# Patient Record
Sex: FEMALE | Race: WHITE | NOT HISPANIC OR LATINO | URBAN - METROPOLITAN AREA
[De-identification: names, ages, dates, MRNs, and addresses within clinical notes are randomized per-mention and may not be internally consistent; named-entity substitution may affect disease eponyms.]

---

## 2018-07-14 ENCOUNTER — INPATIENT (INPATIENT)
Facility: HOSPITAL | Age: 83
LOS: 3 days | Discharge: NURSING, REHAB OR LONG-TERM CARE | End: 2018-07-18
Attending: HOSPITALIST | Admitting: HOSPITALIST
Payer: MEDICARE

## 2018-07-14 VITALS
TEMPERATURE: 98 F | DIASTOLIC BLOOD PRESSURE: 101 MMHG | HEART RATE: 79 BPM | RESPIRATION RATE: 18 BRPM | SYSTOLIC BLOOD PRESSURE: 204 MMHG | WEIGHT: 160.06 LBS | OXYGEN SATURATION: 95 %

## 2018-07-14 DIAGNOSIS — I10 ESSENTIAL (PRIMARY) HYPERTENSION: ICD-10-CM

## 2018-07-14 DIAGNOSIS — Z98.890 OTHER SPECIFIED POSTPROCEDURAL STATES: Chronic | ICD-10-CM

## 2018-07-14 DIAGNOSIS — E78.00 PURE HYPERCHOLESTEROLEMIA, UNSPECIFIED: ICD-10-CM

## 2018-07-14 DIAGNOSIS — C50.919 MALIGNANT NEOPLASM OF UNSPECIFIED SITE OF UNSPECIFIED FEMALE BREAST: ICD-10-CM
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
## 2018-07-14 DIAGNOSIS — T79.6XXA TRAUMATIC ISCHEMIA OF MUSCLE, INITIAL ENCOUNTER: ICD-10-CM

## 2018-07-14 LAB
ALBUMIN SERPL ELPH-MCNC: 4.6 G/DL — SIGNIFICANT CHANGE UP (ref 3.5–5.2)
ALP SERPL-CCNC: 59 U/L — SIGNIFICANT CHANGE UP (ref 30–115)
ALT FLD-CCNC: 18 U/L — SIGNIFICANT CHANGE UP (ref 0–41)
ANION GAP SERPL CALC-SCNC: 16 MMOL/L — HIGH (ref 7–14)
APTT BLD: 22.1 SEC — CRITICAL LOW (ref 27–39.2)
AST SERPL-CCNC: 48 U/L — HIGH (ref 0–41)
BASOPHILS # BLD AUTO: 0.01 K/UL — SIGNIFICANT CHANGE UP (ref 0–0.2)
BASOPHILS NFR BLD AUTO: 0.1 % — SIGNIFICANT CHANGE UP (ref 0–1)
BILIRUB SERPL-MCNC: 1.1 MG/DL — SIGNIFICANT CHANGE UP (ref 0.2–1.2)
BUN SERPL-MCNC: 28 MG/DL — HIGH (ref 10–20)
CALCIUM SERPL-MCNC: 9.8 MG/DL — SIGNIFICANT CHANGE UP (ref 8.5–10.1)
CHLORIDE SERPL-SCNC: 99 MMOL/L — SIGNIFICANT CHANGE UP (ref 98–110)
CK SERPL-CCNC: >2000 U/L — HIGH (ref 0–225)
CO2 SERPL-SCNC: 27 MMOL/L — SIGNIFICANT CHANGE UP (ref 17–32)
CREAT SERPL-MCNC: 0.9 MG/DL — SIGNIFICANT CHANGE UP (ref 0.7–1.5)
EOSINOPHIL # BLD AUTO: 0 K/UL — SIGNIFICANT CHANGE UP (ref 0–0.7)
EOSINOPHIL NFR BLD AUTO: 0 % — SIGNIFICANT CHANGE UP (ref 0–8)
GLUCOSE SERPL-MCNC: 118 MG/DL — HIGH (ref 70–99)
HCT VFR BLD CALC: 39.3 % — SIGNIFICANT CHANGE UP (ref 37–47)
HGB BLD-MCNC: 13.5 G/DL — SIGNIFICANT CHANGE UP (ref 12–16)
IMM GRANULOCYTES NFR BLD AUTO: 0.3 % — SIGNIFICANT CHANGE UP (ref 0.1–0.3)
INR BLD: 1.04 RATIO — SIGNIFICANT CHANGE UP (ref 0.65–1.3)
LYMPHOCYTES # BLD AUTO: 0.77 K/UL — LOW (ref 1.2–3.4)
LYMPHOCYTES # BLD AUTO: 8.1 % — LOW (ref 20.5–51.1)
MAGNESIUM SERPL-MCNC: 2.2 MG/DL — SIGNIFICANT CHANGE UP (ref 1.8–2.4)
MCHC RBC-ENTMCNC: 32.2 PG — HIGH (ref 27–31)
MCHC RBC-ENTMCNC: 34.4 G/DL — SIGNIFICANT CHANGE UP (ref 32–37)
MCV RBC AUTO: 93.8 FL — SIGNIFICANT CHANGE UP (ref 81–99)
MONOCYTES # BLD AUTO: 0.51 K/UL — SIGNIFICANT CHANGE UP (ref 0.1–0.6)
MONOCYTES NFR BLD AUTO: 5.4 % — SIGNIFICANT CHANGE UP (ref 1.7–9.3)
NEUTROPHILS # BLD AUTO: 8.16 K/UL — HIGH (ref 1.4–6.5)
NEUTROPHILS NFR BLD AUTO: 86.1 % — HIGH (ref 42.2–75.2)
NRBC # BLD: 0 /100 WBCS — SIGNIFICANT CHANGE UP (ref 0–0)
PLATELET # BLD AUTO: 174 K/UL — SIGNIFICANT CHANGE UP (ref 130–400)
POTASSIUM SERPL-MCNC: 3.9 MMOL/L — SIGNIFICANT CHANGE UP (ref 3.5–5)
POTASSIUM SERPL-SCNC: 3.9 MMOL/L — SIGNIFICANT CHANGE UP (ref 3.5–5)
PROT SERPL-MCNC: 7.6 G/DL — SIGNIFICANT CHANGE UP (ref 6–8)
PROTHROM AB SERPL-ACNC: 11.2 SEC — SIGNIFICANT CHANGE UP (ref 9.95–12.87)
RBC # BLD: 4.19 M/UL — LOW (ref 4.2–5.4)
RBC # FLD: 12.7 % — SIGNIFICANT CHANGE UP (ref 11.5–14.5)
SODIUM SERPL-SCNC: 142 MMOL/L — SIGNIFICANT CHANGE UP (ref 135–146)
WBC # BLD: 9.48 K/UL — SIGNIFICANT CHANGE UP (ref 4.8–10.8)
WBC # FLD AUTO: 9.48 K/UL — SIGNIFICANT CHANGE UP (ref 4.8–10.8)

## 2018-07-14 RX ORDER — CARVEDILOL PHOSPHATE 80 MG/1
12.5 CAPSULE, EXTENDED RELEASE ORAL EVERY 12 HOURS
Qty: 0 | Refills: 0 | Status: DISCONTINUED | OUTPATIENT
Start: 2018-07-14 | End: 2018-07-18

## 2018-07-14 RX ORDER — ACETAMINOPHEN 500 MG
325 TABLET ORAL EVERY 4 HOURS
Qty: 0 | Refills: 0 | Status: DISCONTINUED | OUTPATIENT
Start: 2018-07-14 | End: 2018-07-18

## 2018-07-14 RX ORDER — ATORVASTATIN CALCIUM 80 MG/1
1 TABLET, FILM COATED ORAL
Qty: 0 | Refills: 0 | COMMUNITY

## 2018-07-14 RX ORDER — CARVEDILOL PHOSPHATE 80 MG/1
1 CAPSULE, EXTENDED RELEASE ORAL
Qty: 0 | Refills: 0 | COMMUNITY

## 2018-07-14 RX ORDER — ENOXAPARIN SODIUM 100 MG/ML
30 INJECTION SUBCUTANEOUS EVERY 24 HOURS
Qty: 0 | Refills: 0 | Status: DISCONTINUED | OUTPATIENT
Start: 2018-07-14 | End: 2018-07-18

## 2018-07-14 RX ORDER — SODIUM CHLORIDE 9 MG/ML
1000 INJECTION INTRAMUSCULAR; INTRAVENOUS; SUBCUTANEOUS
Qty: 0 | Refills: 0 | Status: DISCONTINUED | OUTPATIENT
Start: 2018-07-14 | End: 2018-07-18

## 2018-07-14 RX ORDER — ATORVASTATIN CALCIUM 80 MG/1
10 TABLET, FILM COATED ORAL DAILY
Qty: 0 | Refills: 0 | Status: DISCONTINUED | OUTPATIENT
Start: 2018-07-14 | End: 2018-07-16

## 2018-07-14 RX ORDER — SODIUM CHLORIDE 9 MG/ML
1000 INJECTION INTRAMUSCULAR; INTRAVENOUS; SUBCUTANEOUS
Qty: 0 | Refills: 0 | Status: DISCONTINUED | OUTPATIENT
Start: 2018-07-14 | End: 2018-07-14

## 2018-07-14 RX ORDER — LETROZOLE 2.5 MG/1
1 TABLET, FILM COATED ORAL
Qty: 0 | Refills: 0 | COMMUNITY

## 2018-07-14 RX ORDER — PANTOPRAZOLE SODIUM 20 MG/1
40 TABLET, DELAYED RELEASE ORAL
Qty: 0 | Refills: 0 | Status: DISCONTINUED | OUTPATIENT
Start: 2018-07-14 | End: 2018-07-18

## 2018-07-14 RX ORDER — LETROZOLE 2.5 MG/1
2.5 TABLET, FILM COATED ORAL DAILY
Qty: 0 | Refills: 0 | Status: DISCONTINUED | OUTPATIENT
Start: 2018-07-14 | End: 2018-07-18

## 2018-07-14 RX ORDER — EZETIMIBE 10 MG/1
1 TABLET ORAL
Qty: 0 | Refills: 0 | COMMUNITY

## 2018-07-14 RX ADMIN — ATORVASTATIN CALCIUM 10 MILLIGRAM(S): 80 TABLET, FILM COATED ORAL at 17:29

## 2018-07-14 RX ADMIN — CARVEDILOL PHOSPHATE 12.5 MILLIGRAM(S): 80 CAPSULE, EXTENDED RELEASE ORAL at 14:05

## 2018-07-14 RX ADMIN — SODIUM CHLORIDE 200 MILLILITER(S): 9 INJECTION INTRAMUSCULAR; INTRAVENOUS; SUBCUTANEOUS at 14:07

## 2018-07-14 RX ADMIN — SODIUM CHLORIDE 75 MILLILITER(S): 9 INJECTION INTRAMUSCULAR; INTRAVENOUS; SUBCUTANEOUS at 16:34

## 2018-07-14 RX ADMIN — PANTOPRAZOLE SODIUM 40 MILLIGRAM(S): 20 TABLET, DELAYED RELEASE ORAL at 17:29

## 2018-07-14 RX ADMIN — Medication 2.5 MILLIGRAM(S): at 17:30

## 2018-07-14 RX ADMIN — ENOXAPARIN SODIUM 30 MILLIGRAM(S): 100 INJECTION SUBCUTANEOUS at 17:29

## 2018-07-14 RX ADMIN — LETROZOLE 2.5 MILLIGRAM(S): 2.5 TABLET, FILM COATED ORAL at 17:29

## 2018-07-14 NOTE — H&P ADULT - ASSESSMENT
89 y/o female admitted after mechanical fall at home, unable to walk in ER with traumatic muscle injury ( rhabdomyolysis) getting admitted for IV hydration and PT, most likely pt needs placement.

## 2018-07-14 NOTE — H&P ADULT - NSHPLABSRESULTS_GEN_ALL_CORE
13.5   9.48  )-----------( 174      ( 14 Jul 2018 11:26 )             39.3   07-14    142  |  99  |  28<H>  ----------------------------<  118<H>  3.9   |  27  |  0.9    Ca    9.8      14 Jul 2018 11:26  Mg     2.2     07-14    TPro  7.6  /  Alb  4.6  /  TBili  1.1  /  DBili  x   /  AST  48<H>  /  ALT  18  /  AlkPhos  59  07-14  RADIOLOGY:   < from: Xray Foot AP + Lateral, Left (07.14.18 @ 11:54) >    Findings/  impression: No acutely displaced fractures with osteopenia. Hallux valgus   deformity with moderate osteoarthritis. Second hammertoe deformity   present. No radiopaque foreign body. Midfoot and forefoot neuropathic   degenerative changes with vascular calcifications.    < from: Xray Femur 2 Views, Left (07.14.18 @ 11:53) >    Findings/  impression: No acute fracture or dislocation. Greater trochanter   enthesopathy. Moderate left hip degenerative change and vascular   calcifications are noted.    < from: Xray Chest 1 View- PORTABLE-Urgent (07.14.18 @ 11:52) >    < from: Xray Tibia + Fibula 2 Views, Left (07.14.18 @ 11:52) >      Findings/  impression: No acute fracture. Knee and ankle alignment maintained.   Proximal medial tibia mid tibial soft tissue swelling is present. No   radiopaque foreign body or subcutaneous emphysema. Vascular   calcifications noted    Impression:      No radiographic evidence of acute cardiopulmonary disease.

## 2018-07-14 NOTE — ED PROVIDER NOTE - MEDICAL DECISION MAKING DETAILS
Chart finished. pt s/p fall, lives alone, PE as above, imaging unremarkable but unable to safely stand/walk, will admit for rehab/possible placement

## 2018-07-14 NOTE — ED PROVIDER NOTE - OBJECTIVE STATEMENT
Fell at home last night trying to clean floor, Found by neighbor this am, No head injury no LOC, C/o right lower leg and buttock pain, No SOB, no back or abdominal pain,

## 2018-07-14 NOTE — H&P ADULT - NSHPPHYSICALEXAM_GEN_ALL_CORE
PHYSICAL EXAM:      Constitutional: frail elderly female   Neck: supple, no JVD  Back: no paraspinal tenderness  Respiratory: decreased BS b/l   Cardiovascular: S1, S2, RRR   Gastrointestinal: soft, NT, ND, BS present   Extremities: no edema on LE  Vascular: peripheral pulses intact   Neurological: AAOx3, no focal neurological deficit   Skin: dry skin     Vital Signs Last 24 Hrs  T(C): 36.4 (14 Jul 2018 10:59), Max: 36.4 (14 Jul 2018 10:59)  T(F): 97.6 (14 Jul 2018 10:59), Max: 97.6 (14 Jul 2018 10:59)  HR: 81 (14 Jul 2018 14:21) (79 - 81)  BP: 190/98 (14 Jul 2018 14:21) (190/98 - 204/101)  RR: 17 (14 Jul 2018 14:21) (17 - 18)  SpO2: 96% (14 Jul 2018 14:21) (95% - 96%) PHYSICAL EXAM:  Constitutional: frail elderly female   Neck: supple, no JVD  Back: no paraspinal tenderness  Respiratory: decreased BS b/l   Cardiovascular: S1, S2, RRR   Gastrointestinal: soft, NT, ND, BS present   Extremities: no edema on LE  Vascular: peripheral pulses intact   Neurological: AAOx3, no focal neurological deficit   Skin: dry skin, large ecchymose on LLE and left elbow     Vital Signs Last 24 Hrs  T(C): 36.4 (14 Jul 2018 10:59), Max: 36.4 (14 Jul 2018 10:59)  T(F): 97.6 (14 Jul 2018 10:59), Max: 97.6 (14 Jul 2018 10:59)  HR: 81 (14 Jul 2018 14:21) (79 - 81)  BP: 190/98 (14 Jul 2018 14:21) (190/98 - 204/101)  RR: 17 (14 Jul 2018 14:21) (17 - 18)  SpO2: 96% (14 Jul 2018 14:21) (95% - 96%)

## 2018-07-14 NOTE — ED ADULT NURSE REASSESSMENT NOTE - NS ED NURSE REASSESS COMMENT FT1
Skin intach no signs of breakdown; Ecchymosis to L lower extremity and b/l arms; sacrum reddened but blanchable.

## 2018-07-14 NOTE — H&P ADULT - PROBLEM SELECTOR PLAN 1
fall precautions, pain meds PRN, PT/Rehab, multiple XRAys are negative for Fx, will check UA , urine culture

## 2018-07-14 NOTE — H&P ADULT - HISTORY OF PRESENT ILLNESS
89 y/o female fell at home last night trying to clean floor. She was found by neighbor this morning, pt was not able to get up. Pt lives alone and refused assisted living in the past, she was brought to ER and was not able to ambulate.  No head injury no LOC, pt complaints of  right lower leg and buttock pain, No SOB, no back or abdominal pain. 89 y/o female fell at home last night trying to clean the floor. She was found by neighbor this morning, pt was not able to get up. Pt lives alone and refused assisted living in the past, she was brought to ER and was not able to ambulate.  No head injury no LOC, pt complaints of  left LE pain and bruises on left leg and left elbow.    In ER patient was very anxious and BP was elevated. I spoke with her granddaughter at the time of admission.

## 2018-07-15 LAB
ALBUMIN SERPL ELPH-MCNC: 3.5 G/DL — SIGNIFICANT CHANGE UP (ref 3.5–5.2)
ALP SERPL-CCNC: 44 U/L — SIGNIFICANT CHANGE UP (ref 30–115)
ALT FLD-CCNC: 20 U/L — SIGNIFICANT CHANGE UP (ref 0–41)
ANION GAP SERPL CALC-SCNC: 17 MMOL/L — HIGH (ref 7–14)
AST SERPL-CCNC: 55 U/L — HIGH (ref 0–41)
BILIRUB SERPL-MCNC: 0.9 MG/DL — SIGNIFICANT CHANGE UP (ref 0.2–1.2)
BUN SERPL-MCNC: 29 MG/DL — HIGH (ref 10–20)
CALCIUM SERPL-MCNC: 8.1 MG/DL — LOW (ref 8.5–10.1)
CHLORIDE SERPL-SCNC: 101 MMOL/L — SIGNIFICANT CHANGE UP (ref 98–110)
CK SERPL-CCNC: 2662 U/L — HIGH (ref 0–225)
CO2 SERPL-SCNC: 22 MMOL/L — SIGNIFICANT CHANGE UP (ref 17–32)
CREAT SERPL-MCNC: 0.8 MG/DL — SIGNIFICANT CHANGE UP (ref 0.7–1.5)
GLUCOSE SERPL-MCNC: 99 MG/DL — SIGNIFICANT CHANGE UP (ref 70–99)
HCT VFR BLD CALC: 32.6 % — LOW (ref 37–47)
HGB BLD-MCNC: 10.9 G/DL — LOW (ref 12–16)
MAGNESIUM SERPL-MCNC: 2.1 MG/DL — SIGNIFICANT CHANGE UP (ref 1.8–2.4)
MCHC RBC-ENTMCNC: 31.5 PG — HIGH (ref 27–31)
MCHC RBC-ENTMCNC: 33.4 G/DL — SIGNIFICANT CHANGE UP (ref 32–37)
MCV RBC AUTO: 94.2 FL — SIGNIFICANT CHANGE UP (ref 81–99)
NRBC # BLD: 0 /100 WBCS — SIGNIFICANT CHANGE UP (ref 0–0)
PLATELET # BLD AUTO: 147 K/UL — SIGNIFICANT CHANGE UP (ref 130–400)
POTASSIUM SERPL-MCNC: 3.6 MMOL/L — SIGNIFICANT CHANGE UP (ref 3.5–5)
POTASSIUM SERPL-SCNC: 3.6 MMOL/L — SIGNIFICANT CHANGE UP (ref 3.5–5)
PROT SERPL-MCNC: 6 G/DL — SIGNIFICANT CHANGE UP (ref 6–8)
RBC # BLD: 3.46 M/UL — LOW (ref 4.2–5.4)
RBC # FLD: 13 % — SIGNIFICANT CHANGE UP (ref 11.5–14.5)
SODIUM SERPL-SCNC: 140 MMOL/L — SIGNIFICANT CHANGE UP (ref 135–146)
WBC # BLD: 6.95 K/UL — SIGNIFICANT CHANGE UP (ref 4.8–10.8)
WBC # FLD AUTO: 6.95 K/UL — SIGNIFICANT CHANGE UP (ref 4.8–10.8)

## 2018-07-15 RX ADMIN — LETROZOLE 2.5 MILLIGRAM(S): 2.5 TABLET, FILM COATED ORAL at 15:05

## 2018-07-15 RX ADMIN — SODIUM CHLORIDE 75 MILLILITER(S): 9 INJECTION INTRAMUSCULAR; INTRAVENOUS; SUBCUTANEOUS at 06:21

## 2018-07-15 RX ADMIN — CARVEDILOL PHOSPHATE 12.5 MILLIGRAM(S): 80 CAPSULE, EXTENDED RELEASE ORAL at 06:20

## 2018-07-15 RX ADMIN — PANTOPRAZOLE SODIUM 40 MILLIGRAM(S): 20 TABLET, DELAYED RELEASE ORAL at 06:21

## 2018-07-15 RX ADMIN — CARVEDILOL PHOSPHATE 12.5 MILLIGRAM(S): 80 CAPSULE, EXTENDED RELEASE ORAL at 17:19

## 2018-07-15 RX ADMIN — Medication 2.5 MILLIGRAM(S): at 06:21

## 2018-07-15 NOTE — PROGRESS NOTE ADULT - ASSESSMENT
89 y/o female admitted after mechanical fall at home, unable to walk in ER with traumatic muscle injury ( rhabdomyolysis) getting admitted for IV hydration and PT, most likely pt needs placement.

## 2018-07-15 NOTE — PHYSICAL THERAPY INITIAL EVALUATION ADULT - IMPAIRMENTS FOUND, PT EVAL
aerobic capacity/endurance/ergonomics and body mechanics/muscle strength/posture/gait, locomotion, and balance

## 2018-07-15 NOTE — PROGRESS NOTE ADULT - PROBLEM SELECTOR PLAN 1
fall precautions, pain meds PRN, PT/Rehab, multiple XRAys are negative for Fx,  continue ivf and follow ck levels

## 2018-07-15 NOTE — PROGRESS NOTE ADULT - SUBJECTIVE AND OBJECTIVE BOX
Patient is comfortable in bed no complaints      T(F): 98.1 (07-15-18 @ 05:18), Max: 98.1 (07-15-18 @ 05:18)  HR: 76 (07-15-18 @ 05:18)  BP: 111/56 (07-15-18 @ 05:18)  RR: 16 (07-15-18 @ 05:18)  SpO2: 96% (07-14-18 @ 14:21) (96% - 96%)    PHYSICAL EXAM:  GENERAL: NAD, well-groomed, well-developed  HEAD:  Atraumatic, Normocephalic  EYES: EOMI, PERRLA, conjunctiva and sclera clear  ENMT: No tonsillar erythema, exudates, or enlargement; Moist mucous membranes, Good dentition, No lesions  NECK: Supple, No JVD, Normal thyroid  NERVOUS SYSTEM:  Alert & Oriented X3, Good concentration; Motor Strength 5/5 B/L upper and lower extremities; DTRs 2+ intact and symmetric  CHEST/LUNG: Clear to percussion bilaterally; No rales, rhonchi, wheezing, or rubs  HEART: Regular rate and rhythm; No murmurs, rubs, or gallops  ABDOMEN: Soft, Nontender, Nondistended; Bowel sounds present  EXTREMITIES:  2+ Peripheral Pulses, No clubbing, cyanosis, or edema  LYMPH: No lymphadenopathy noted  SKIN: No rashes or lesions    LABS  07-15    140  |  101  |  29<H>  ----------------------------<  99  3.6   |  22  |  0.8    Ca    8.1<L>      15 Jul 2018 06:50  Mg     2.1     07-15    TPro  6.0  /  Alb  3.5  /  TBili  0.9  /  DBili  x   /  AST  55<H>  /  ALT  20  /  AlkPhos  44  07-15                          10.9   6.95  )-----------( 147      ( 15 Jul 2018 06:50 )             32.6     PT/INR - ( 14 Jul 2018 11:26 )   PT: 11.20 sec;   INR: 1.04 ratio         PTT - ( 14 Jul 2018 11:26 )  PTT:22.1 sec    CARDIAC ENZYMES  Creatine Kinase, Serum: 2662 (07-15 @ 06:50)  Creatine Kinase, Serum: 2409 (07-14 @ 11:26)            RADIOLOGY    MEDICATIONS  (STANDING):  atorvastatin Oral Tab/Cap - Peds 10 milliGRAM(s) Oral daily  carvedilol 12.5 milliGRAM(s) Oral every 12 hours  enalapril 2.5 milliGRAM(s) Oral daily  enoxaparin Injectable 30 milliGRAM(s) SubCutaneous every 24 hours  letrozole 2.5 milliGRAM(s) Oral daily  pantoprazole    Tablet 40 milliGRAM(s) Oral before breakfast  sodium chloride 0.9%. 1000 milliLiter(s) (75 mL/Hr) IV Continuous <Continuous>    MEDICATIONS  (PRN):  acetaminophen   Tablet. 325 milliGRAM(s) Oral every 4 hours PRN Moderate Pain (4 - 6)

## 2018-07-15 NOTE — PHYSICAL THERAPY INITIAL EVALUATION ADULT - IMPAIRMENTS CONTRIBUTING IMPAIRED BED MOBILITY, REHAB EVAL
impaired postural control/narrow base of support/decreased strength/decreased endurance/impaired balance

## 2018-07-15 NOTE — PHYSICAL THERAPY INITIAL EVALUATION ADULT - GENERAL OBSERVATIONS, REHAB EVAL
9:20 - 9:45.  Chart reviewed. Patient available to be seen for physical therapy, confirmed with nurse. Patient encountered semi-reclined in bed, +ecchymosis LLE. Patient complains of left hip and leg pain but would like to walk to bathroom.

## 2018-07-16 DIAGNOSIS — M79.89 OTHER SPECIFIED SOFT TISSUE DISORDERS: ICD-10-CM

## 2018-07-16 LAB
ALBUMIN SERPL ELPH-MCNC: 3.3 G/DL — LOW (ref 3.5–5.2)
ALP SERPL-CCNC: 42 U/L — SIGNIFICANT CHANGE UP (ref 30–115)
ALT FLD-CCNC: 23 U/L — SIGNIFICANT CHANGE UP (ref 0–41)
ANION GAP SERPL CALC-SCNC: 13 MMOL/L — SIGNIFICANT CHANGE UP (ref 7–14)
ANION GAP SERPL CALC-SCNC: 13 MMOL/L — SIGNIFICANT CHANGE UP (ref 7–14)
AST SERPL-CCNC: 53 U/L — HIGH (ref 0–41)
BILIRUB SERPL-MCNC: 0.5 MG/DL — SIGNIFICANT CHANGE UP (ref 0.2–1.2)
BUN SERPL-MCNC: 19 MG/DL — SIGNIFICANT CHANGE UP (ref 10–20)
BUN SERPL-MCNC: 21 MG/DL — HIGH (ref 10–20)
CALCIUM SERPL-MCNC: 8.2 MG/DL — LOW (ref 8.5–10.1)
CALCIUM SERPL-MCNC: 8.4 MG/DL — LOW (ref 8.5–10.1)
CHLORIDE SERPL-SCNC: 105 MMOL/L — SIGNIFICANT CHANGE UP (ref 98–110)
CHLORIDE SERPL-SCNC: 105 MMOL/L — SIGNIFICANT CHANGE UP (ref 98–110)
CK SERPL-CCNC: 1599 U/L — HIGH (ref 0–225)
CK SERPL-CCNC: 1831 U/L — HIGH (ref 0–225)
CO2 SERPL-SCNC: 26 MMOL/L — SIGNIFICANT CHANGE UP (ref 17–32)
CO2 SERPL-SCNC: 26 MMOL/L — SIGNIFICANT CHANGE UP (ref 17–32)
CREAT SERPL-MCNC: 0.7 MG/DL — SIGNIFICANT CHANGE UP (ref 0.7–1.5)
CREAT SERPL-MCNC: 0.7 MG/DL — SIGNIFICANT CHANGE UP (ref 0.7–1.5)
GLUCOSE SERPL-MCNC: 106 MG/DL — HIGH (ref 70–99)
GLUCOSE SERPL-MCNC: 95 MG/DL — SIGNIFICANT CHANGE UP (ref 70–99)
HCT VFR BLD CALC: 31.8 % — LOW (ref 37–47)
HGB BLD-MCNC: 10.7 G/DL — LOW (ref 12–16)
MCHC RBC-ENTMCNC: 32.3 PG — HIGH (ref 27–31)
MCHC RBC-ENTMCNC: 33.6 G/DL — SIGNIFICANT CHANGE UP (ref 32–37)
MCV RBC AUTO: 96.1 FL — SIGNIFICANT CHANGE UP (ref 81–99)
NRBC # BLD: 0 /100 WBCS — SIGNIFICANT CHANGE UP (ref 0–0)
PLATELET # BLD AUTO: 139 K/UL — SIGNIFICANT CHANGE UP (ref 130–400)
POTASSIUM SERPL-MCNC: 4.1 MMOL/L — SIGNIFICANT CHANGE UP (ref 3.5–5)
POTASSIUM SERPL-MCNC: 4.4 MMOL/L — SIGNIFICANT CHANGE UP (ref 3.5–5)
POTASSIUM SERPL-SCNC: 4.1 MMOL/L — SIGNIFICANT CHANGE UP (ref 3.5–5)
POTASSIUM SERPL-SCNC: 4.4 MMOL/L — SIGNIFICANT CHANGE UP (ref 3.5–5)
PROT SERPL-MCNC: 5.8 G/DL — LOW (ref 6–8)
RBC # BLD: 3.31 M/UL — LOW (ref 4.2–5.4)
RBC # FLD: 12.9 % — SIGNIFICANT CHANGE UP (ref 11.5–14.5)
SODIUM SERPL-SCNC: 144 MMOL/L — SIGNIFICANT CHANGE UP (ref 135–146)
SODIUM SERPL-SCNC: 144 MMOL/L — SIGNIFICANT CHANGE UP (ref 135–146)
WBC # BLD: 5.53 K/UL — SIGNIFICANT CHANGE UP (ref 4.8–10.8)
WBC # FLD AUTO: 5.53 K/UL — SIGNIFICANT CHANGE UP (ref 4.8–10.8)

## 2018-07-16 PROCEDURE — 93970 EXTREMITY STUDY: CPT | Mod: 26

## 2018-07-16 RX ORDER — CHLORHEXIDINE GLUCONATE 213 G/1000ML
1 SOLUTION TOPICAL
Qty: 0 | Refills: 0 | Status: DISCONTINUED | OUTPATIENT
Start: 2018-07-16 | End: 2018-07-18

## 2018-07-16 RX ORDER — ALPRAZOLAM 0.25 MG
0.12 TABLET ORAL
Qty: 0 | Refills: 0 | Status: DISCONTINUED | OUTPATIENT
Start: 2018-07-16 | End: 2018-07-18

## 2018-07-16 RX ORDER — ATORVASTATIN CALCIUM 80 MG/1
10 TABLET, FILM COATED ORAL AT BEDTIME
Qty: 0 | Refills: 0 | Status: DISCONTINUED | OUTPATIENT
Start: 2018-07-16 | End: 2018-07-18

## 2018-07-16 RX ADMIN — LETROZOLE 2.5 MILLIGRAM(S): 2.5 TABLET, FILM COATED ORAL at 12:02

## 2018-07-16 RX ADMIN — ENOXAPARIN SODIUM 30 MILLIGRAM(S): 100 INJECTION SUBCUTANEOUS at 18:36

## 2018-07-16 RX ADMIN — CARVEDILOL PHOSPHATE 12.5 MILLIGRAM(S): 80 CAPSULE, EXTENDED RELEASE ORAL at 18:36

## 2018-07-16 RX ADMIN — SODIUM CHLORIDE 75 MILLILITER(S): 9 INJECTION INTRAMUSCULAR; INTRAVENOUS; SUBCUTANEOUS at 00:40

## 2018-07-16 RX ADMIN — PANTOPRAZOLE SODIUM 40 MILLIGRAM(S): 20 TABLET, DELAYED RELEASE ORAL at 06:00

## 2018-07-16 RX ADMIN — SODIUM CHLORIDE 75 MILLILITER(S): 9 INJECTION INTRAMUSCULAR; INTRAVENOUS; SUBCUTANEOUS at 18:41

## 2018-07-16 RX ADMIN — CARVEDILOL PHOSPHATE 12.5 MILLIGRAM(S): 80 CAPSULE, EXTENDED RELEASE ORAL at 05:59

## 2018-07-16 RX ADMIN — Medication 0.12 MILLIGRAM(S): at 20:10

## 2018-07-16 NOTE — DIETITIAN INITIAL EVALUATION ADULT. - MD RECOMMEND
Route to provider, patient requests 90 day supply    Julio César Wang RN     Recommendation: (1) Order MVI q24hrs. (2) Order 250 mg Vit C q12hrs. (3) Continue current diet order as tolerated.

## 2018-07-16 NOTE — PROVIDER CONTACT NOTE (OTHER) - SITUATION
BP now 185/77 after receiving Capoten, feeling nervous requesting xanax BP now 185/77 after receiving Coreg, feeling nervous requesting xanax

## 2018-07-16 NOTE — DIETITIAN INITIAL EVALUATION ADULT. - SOURCE
Fair appetite & po intake PTP. 2 meals/day. No preferences. NKFA. No supplements. Reports wt loss, but unable to quantify time frame or amount.

## 2018-07-16 NOTE — PROGRESS NOTE ADULT - ASSESSMENT
89 y/o female fell at home last night trying to clean the floor. She was found by neighbor this morning, pt was not able to get up. Pt lives alone and refused assisted living in the past, she was brought to ER and was not able to ambulate.    Pt was admitted after mechanical fall with traumatic muscle injury and rhabdomyolysis, she was able to ambulate with PT.  I spoke with pts son, they are considering SNF ( in the city), for now, will get LLE Doppler and plan discharge in 24 hours.

## 2018-07-16 NOTE — PROGRESS NOTE ADULT - SUBJECTIVE AND OBJECTIVE BOX
89 y/o female fell at home last night trying to clean the floor. She was found by neighbor this morning, pt was not able to get up. Pt lives alone and refused assisted living in the past, she was brought to ER and was not able to ambulate.    Pt was admitted after mechanical fall with traumatic muscle injury and rhabdomyolysis, she was able to ambulate with PT.  Today she is c/o LLE pain and swelling, family by the bedside.      Vital Signs Last 24 Hrs  T(C): 36.2 (16 Jul 2018 13:45), Max: 36.3 (15 Jul 2018 22:19)  T(F): 97.2 (16 Jul 2018 13:45), Max: 97.3 (15 Jul 2018 22:19)  HR: 66 (16 Jul 2018 13:45) (62 - 66)  BP: 193/85 (16 Jul 2018 13:45) (147/67 - 193/85)  BP(mean): --  RR: 16 (16 Jul 2018 13:45) (16 - 16)    PHYSICAL EXAM:  GENERAL: NAD, well-groomed, frail female   HEAD:  Atraumatic, Normocephalic  EYES: EOMI, PERRLA, conjunctiva and sclera clear  ENMT: No tonsillar erythema, exudates, or enlargement; Moist mucous membranes, Good dentition, No lesions  NECK: Supple, No JVD, Normal thyroid  NERVOUS SYSTEM:  Alert & Oriented X3, Good concentration; Motor Strength 5/5 B/L upper and lower extremities; DTRs 2+ intact and symmetric  CHEST/LUNG: Clear to percussion bilaterally; No rales, rhonchi, wheezing, or rubs  HEART: Regular rate and rhythm; No murmurs, rubs, or gallops  ABDOMEN: Soft, Nontender, Nondistended; Bowel sounds present  EXTREMITIES:  2edema on LLE with echinoses and calf tenderness, no edema on RLE   LYMPH: No lymphadenopathy noted  SKIN: No rashes or lesions      LABS:                        10.7   5.53  )-----------( 139      ( 16 Jul 2018 10:57 )             31.8     07-16    144  |  105  |  19  ----------------------------<  106<H>  4.1   |  26  |  0.7    Ca    8.2<L>      16 Jul 2018 10:57  Mg     2.1     07-15    TPro  5.8<L>  /  Alb  3.3<L>  /  TBili  0.5  /  DBili  x   /  AST  53<H>  /  ALT  23  /  AlkPhos  42  07-16      Creatine Kinase, Serum (07.16.18 @ 10:57)    Creatine Kinase, Serum: 1599 U/L    RADIOLOGY & ADDITIONAL TESTS:  < from: Xray Foot AP + Lateral, Left (07.14.18 @ 11:54) >  Findings/  impression: No acutely displaced fractures with osteopenia. Hallux valgus   deformity with moderate osteoarthritis. Second hammertoe deformity   present. No radiopaque foreign body. Midfoot and forefoot neuropathic   degenerative changes with vascular calcifications.    < from: Xray Femur 2 Views, Left (07.14.18 @ 11:53) >    Findings/  impression: No acute fracture or dislocation. Greater trochanter   enthesopathy. Moderate left hip degenerative change and vascular   calcifications are noted.    MEDICATIONS  (STANDING):  atorvastatin Oral Tab/Cap - Peds 10 milliGRAM(s) Oral daily  carvedilol 12.5 milliGRAM(s) Oral every 12 hours  enalapril 2.5 milliGRAM(s) Oral daily  enoxaparin Injectable 30 milliGRAM(s) SubCutaneous every 24 hours  letrozole 2.5 milliGRAM(s) Oral daily  pantoprazole    Tablet 40 milliGRAM(s) Oral before breakfast  sodium chloride 0.9%. 1000 milliLiter(s) (75 mL/Hr) IV Continuous <Continuous>    MEDICATIONS  (PRN):  acetaminophen   Tablet. 325 milliGRAM(s) Oral every 4 hours PRN Moderate Pain (4 - 6)

## 2018-07-16 NOTE — DIETITIAN INITIAL EVALUATION ADULT. - ENERGY NEEDS
Energy: 2599-2094 kcal/day (30-35 kcal/kg ABW)    Protein: 74-89 g/day (1.25-1.5 g/kg ABW)    Fluids: 1 mL/kcal

## 2018-07-16 NOTE — DIETITIAN INITIAL EVALUATION ADULT. - DIET TYPE
DASH/TLC (sodium and cholesterol restricted diet)/Good appetite & po intake reported at this time. Consumes % of meals currently.

## 2018-07-16 NOTE — PROGRESS NOTE ADULT - SUBJECTIVE AND OBJECTIVE BOX
Patient says she no longer takes enalapril however will defer decision to stop this medicine to daytime staff (she refused this am's dose)

## 2018-07-16 NOTE — DIETITIAN INITIAL EVALUATION ADULT. - OTHER INFO
Reason for RD assessment: Stage II decubitus or greater. Pertinent Medical Information: s/p fall. mechanical fall with traumatic muscle injury and rhabdomyolysis.

## 2018-07-17 LAB
ALBUMIN SERPL ELPH-MCNC: 3.5 G/DL — SIGNIFICANT CHANGE UP (ref 3.5–5.2)
ALP SERPL-CCNC: 45 U/L — SIGNIFICANT CHANGE UP (ref 30–115)
ALT FLD-CCNC: 24 U/L — SIGNIFICANT CHANGE UP (ref 0–41)
ANION GAP SERPL CALC-SCNC: 12 MMOL/L — SIGNIFICANT CHANGE UP (ref 7–14)
AST SERPL-CCNC: 46 U/L — HIGH (ref 0–41)
BILIRUB SERPL-MCNC: 0.5 MG/DL — SIGNIFICANT CHANGE UP (ref 0.2–1.2)
BUN SERPL-MCNC: 12 MG/DL — SIGNIFICANT CHANGE UP (ref 10–20)
CALCIUM SERPL-MCNC: 8.1 MG/DL — LOW (ref 8.5–10.1)
CHLORIDE SERPL-SCNC: 106 MMOL/L — SIGNIFICANT CHANGE UP (ref 98–110)
CK SERPL-CCNC: 1204 U/L — HIGH (ref 0–225)
CO2 SERPL-SCNC: 26 MMOL/L — SIGNIFICANT CHANGE UP (ref 17–32)
CREAT SERPL-MCNC: 0.7 MG/DL — SIGNIFICANT CHANGE UP (ref 0.7–1.5)
GLUCOSE SERPL-MCNC: 101 MG/DL — HIGH (ref 70–99)
HCT VFR BLD CALC: 32.2 % — LOW (ref 37–47)
HGB BLD-MCNC: 10.9 G/DL — LOW (ref 12–16)
MCHC RBC-ENTMCNC: 32.2 PG — HIGH (ref 27–31)
MCHC RBC-ENTMCNC: 33.9 G/DL — SIGNIFICANT CHANGE UP (ref 32–37)
MCV RBC AUTO: 95 FL — SIGNIFICANT CHANGE UP (ref 81–99)
NRBC # BLD: 0 /100 WBCS — SIGNIFICANT CHANGE UP (ref 0–0)
PLATELET # BLD AUTO: 142 K/UL — SIGNIFICANT CHANGE UP (ref 130–400)
POTASSIUM SERPL-MCNC: 3.8 MMOL/L — SIGNIFICANT CHANGE UP (ref 3.5–5)
POTASSIUM SERPL-SCNC: 3.8 MMOL/L — SIGNIFICANT CHANGE UP (ref 3.5–5)
PROT SERPL-MCNC: 5.8 G/DL — LOW (ref 6–8)
RBC # BLD: 3.39 M/UL — LOW (ref 4.2–5.4)
RBC # FLD: 12.9 % — SIGNIFICANT CHANGE UP (ref 11.5–14.5)
SODIUM SERPL-SCNC: 144 MMOL/L — SIGNIFICANT CHANGE UP (ref 135–146)
WBC # BLD: 4.32 K/UL — LOW (ref 4.8–10.8)
WBC # FLD AUTO: 4.32 K/UL — LOW (ref 4.8–10.8)

## 2018-07-17 RX ORDER — HYDRALAZINE HCL 50 MG
25 TABLET ORAL ONCE
Qty: 0 | Refills: 0 | Status: COMPLETED | OUTPATIENT
Start: 2018-07-17 | End: 2018-07-17

## 2018-07-17 RX ORDER — ACETAMINOPHEN 500 MG
1 TABLET ORAL
Qty: 0 | Refills: 0 | COMMUNITY
Start: 2018-07-17

## 2018-07-17 RX ORDER — ALPRAZOLAM 0.25 MG
0.12 TABLET ORAL ONCE
Qty: 0 | Refills: 0 | Status: DISCONTINUED | OUTPATIENT
Start: 2018-07-17 | End: 2018-07-17

## 2018-07-17 RX ORDER — PANTOPRAZOLE SODIUM 20 MG/1
1 TABLET, DELAYED RELEASE ORAL
Qty: 0 | Refills: 0 | COMMUNITY
Start: 2018-07-17

## 2018-07-17 RX ORDER — NIFEDIPINE 30 MG
30 TABLET, EXTENDED RELEASE 24 HR ORAL DAILY
Qty: 0 | Refills: 0 | Status: DISCONTINUED | OUTPATIENT
Start: 2018-07-17 | End: 2018-07-18

## 2018-07-17 RX ADMIN — CARVEDILOL PHOSPHATE 12.5 MILLIGRAM(S): 80 CAPSULE, EXTENDED RELEASE ORAL at 05:30

## 2018-07-17 RX ADMIN — Medication 25 MILLIGRAM(S): at 13:50

## 2018-07-17 RX ADMIN — Medication 0.12 MILLIGRAM(S): at 14:45

## 2018-07-17 RX ADMIN — Medication 2.5 MILLIGRAM(S): at 16:56

## 2018-07-17 RX ADMIN — ATORVASTATIN CALCIUM 10 MILLIGRAM(S): 80 TABLET, FILM COATED ORAL at 21:49

## 2018-07-17 RX ADMIN — Medication 30 MILLIGRAM(S): at 16:52

## 2018-07-17 RX ADMIN — CARVEDILOL PHOSPHATE 12.5 MILLIGRAM(S): 80 CAPSULE, EXTENDED RELEASE ORAL at 18:14

## 2018-07-17 RX ADMIN — ENOXAPARIN SODIUM 30 MILLIGRAM(S): 100 INJECTION SUBCUTANEOUS at 16:57

## 2018-07-17 RX ADMIN — PANTOPRAZOLE SODIUM 40 MILLIGRAM(S): 20 TABLET, DELAYED RELEASE ORAL at 06:28

## 2018-07-17 RX ADMIN — LETROZOLE 2.5 MILLIGRAM(S): 2.5 TABLET, FILM COATED ORAL at 13:27

## 2018-07-17 NOTE — DISCHARGE NOTE ADULT - PATIENT PORTAL LINK FT
You can access the [facility] Patient Portal, offered by [facility], by registering with the following website: [email or web address]

## 2018-07-17 NOTE — DISCHARGE NOTE ADULT - SECONDARY DIAGNOSIS.
Breast CA Hypertension, unspecified type Traumatic rhabdomyolysis, initial encounter High cholesterol

## 2018-07-17 NOTE — DISCHARGE NOTE ADULT - PLAN OF CARE
prevent falls PT as tolerated , fall precautions, f/u with PMD take all meds as prescribed ,f/u with PMD comply with diet, take all meds as prescribed ,f/u with PMD resolving, maintain sufficient hydration comply with diet, take all meds as prescribed, f/u with PMD

## 2018-07-17 NOTE — DISCHARGE NOTE ADULT - HOSPITAL COURSE
89 y/o female fell at home last night trying to clean the floor. She was found by neighbor this morning, pt was not able to get up. Pt lives alone and refused assisted living in the past, she was brought to ER and was not able to ambulate.    Pt was admitted after mechanical fall with traumatic muscle injury and rhabdomyolysis, she was on IV hydration for more that 48 hours and CK was trending down. While in the hospital pt was able to ambulate with PT, LE doppler was negative for DVT.   Discharge to SNF was planned.  Today pt was seen and examined at bedside, she feels better, stable for discharge.  I spent more that 35 min on discharge process. 89 y/o female fell at home last night trying to clean the floor. She was found by neighbor this morning, pt was not able to get up. Pt lives alone and refused assisted living in the past, she was brought to ER and was not able to ambulate.    Pt was admitted after mechanical fall with traumatic muscle injury and rhabdomyolysis, she was on IV hydration for more that 48 hours and CK was trending down. While in the hospital pt was able to ambulate with PT, LE doppler was negative for DVT.   Discharge to SNF was planned.  Today pt was seen and examined at bedside, she feels better, stable for discharge.  I spent more that 35 min on discharge process. Case d/w pts son and .

## 2018-07-17 NOTE — DISCHARGE NOTE ADULT - MEDICATION SUMMARY - MEDICATIONS TO TAKE
I will START or STAY ON the medications listed below when I get home from the hospital:    acetaminophen 325 mg oral tablet  -- 1 tab(s) by mouth every 4 hours, As needed, Moderate Pain (4 - 6)  -- Indication: For For pain     enalapril 2.5 mg oral tablet  -- 1 tab(s) by mouth once a day  -- Indication: For Hypertension    Zetia 10 mg oral tablet  -- 1 tab(s) by mouth once a day  -- Indication: For High cholesterol    Lipitor 10 mg oral tablet  -- 1 tab(s) by mouth once a day  -- Indication: For High cholesterol    letrozole 2.5 mg oral tablet  -- 1 tab(s) by mouth once a day  -- Indication: For Breast CA    Coreg 12.5 mg oral tablet  -- 1 tab(s) by mouth 2 times a day  -- Indication: For Hypertension    pantoprazole 40 mg oral delayed release tablet  -- 1 tab(s) by mouth once a day (before a meal)  -- Indication: For GI I will START or STAY ON the medications listed below when I get home from the hospital:    acetaminophen 325 mg oral tablet  -- 1 tab(s) by mouth every 4 hours, As needed, Moderate Pain (4 - 6)  -- Indication: For For pain     enalapril 5 mg oral tablet  -- 1 tab(s) by mouth once a day  -- Indication: For Hypertension, unspecified type    Zetia 10 mg oral tablet  -- 1 tab(s) by mouth once a day  -- Indication: For High cholesterol    Lipitor 10 mg oral tablet  -- 1 tab(s) by mouth once a day  -- Indication: For High cholesterol    letrozole 2.5 mg oral tablet  -- 1 tab(s) by mouth once a day  -- Indication: For Breast CA    Coreg 12.5 mg oral tablet  -- 1 tab(s) by mouth 2 times a day  -- Indication: For Hypertension    NIFEdipine 30 mg oral tablet, extended release  -- 1 tab(s) by mouth once a day  -- Indication: For Hypertension    pantoprazole 40 mg oral delayed release tablet  -- 1 tab(s) by mouth once a day (before a meal)  -- Indication: For GI

## 2018-07-17 NOTE — DISCHARGE NOTE ADULT - CARE PLAN
Principal Discharge DX:	Fall, sequela  Goal:	prevent falls  Assessment and plan of treatment:	PT as tolerated , fall precautions, f/u with PMD  Secondary Diagnosis:	Breast CA  Assessment and plan of treatment:	take all meds as prescribed ,f/u with PMD  Secondary Diagnosis:	Hypertension, unspecified type  Assessment and plan of treatment:	comply with diet, take all meds as prescribed ,f/u with PMD  Secondary Diagnosis:	Traumatic rhabdomyolysis, initial encounter  Assessment and plan of treatment:	resolving, maintain sufficient hydration  Secondary Diagnosis:	High cholesterol  Assessment and plan of treatment:	comply with diet, take all meds as prescribed, f/u with PMD

## 2018-07-18 VITALS — DIASTOLIC BLOOD PRESSURE: 70 MMHG | SYSTOLIC BLOOD PRESSURE: 140 MMHG

## 2018-07-18 RX ORDER — NIFEDIPINE 30 MG
1 TABLET, EXTENDED RELEASE 24 HR ORAL
Qty: 0 | Refills: 0 | COMMUNITY
Start: 2018-07-18

## 2018-07-18 RX ADMIN — PANTOPRAZOLE SODIUM 40 MILLIGRAM(S): 20 TABLET, DELAYED RELEASE ORAL at 07:51

## 2018-07-18 RX ADMIN — Medication 5 MILLIGRAM(S): at 10:03

## 2018-07-18 RX ADMIN — CARVEDILOL PHOSPHATE 12.5 MILLIGRAM(S): 80 CAPSULE, EXTENDED RELEASE ORAL at 10:02

## 2018-07-18 NOTE — PROGRESS NOTE ADULT - SUBJECTIVE AND OBJECTIVE BOX
89 y/o female fell at home last night trying to clean the floor. She was found by neighbor this morning, pt was not able to get up. Pt lives alone and refused assisted living in the past, she was brought to ER and was not able to ambulate.    Pt was admitted after mechanical fall with traumatic muscle injury and rhabdomyolysis, she was able to ambulate with PT.  Yesterday pt was discharged to SNF but did not leave because BP was elevated. Today she feels good, denies any complaints, stable for discharge.     Vital Signs Last 24 Hrs  T(C): 35.7 (18 Jul 2018 06:19), Max: 36.4 (17 Jul 2018 21:34)  T(F): 96.2 (18 Jul 2018 06:19), Max: 97.6 (17 Jul 2018 21:34)  HR: 64 (18 Jul 2018 06:19) (64 - 82)  BP: 140/70 (18 Jul 2018 09:48) (124/66 - 220/90)  RR: 16 (18 Jul 2018 06:19) (16 - 16)      PHYSICAL EXAM:  GENERAL: NAD, well-groomed, frail female   HEAD:  Atraumatic, Normocephalic  EYES: EOMI, PERRLA, conjunctiva and sclera clear  ENMT: No tonsillar erythema, exudates, or enlargement; Moist mucous membranes, Good dentition, No lesions  NECK: Supple, No JVD, Normal thyroid  NERVOUS SYSTEM:  Alert & Oriented X3, Good concentration; Motor Strength 5/5 B/L upper and lower extremities; DTRs 2+ intact and symmetric  CHEST/LUNG: Clear to percussion bilaterally; No rales, rhonchi, wheezing, or rubs  HEART: Regular rate and rhythm; No murmurs, rubs, or gallops  ABDOMEN: Soft, Nontender, Nondistended; Bowel sounds present  EXTREMITIES:  edema on LLE with echinoses and calf tenderness, no edema on RLE   LYMPH: No lymphadenopathy noted  SKIN: No rashes or lesions      LABS:     no new labs                      10.7   5.53  )-----------( 139      ( 16 Jul 2018 10:57 )             31.8     07-16    144  |  105  |  19  ----------------------------<  106<H>  4.1   |  26  |  0.7    Ca    8.2<L>      16 Jul 2018 10:57  Mg     2.1     07-15    TPro  5.8<L>  /  Alb  3.3<L>  /  TBili  0.5  /  DBili  x   /  AST  53<H>  /  ALT  23  /  AlkPhos  42  07-16      Creatine Kinase, Serum (07.16.18 @ 10:57)    Creatine Kinase, Serum: 1599 U/L    RADIOLOGY & ADDITIONAL TESTS:  < from: Xray Foot AP + Lateral, Left (07.14.18 @ 11:54) >  Findings/  impression: No acutely displaced fractures with osteopenia. Hallux valgus   deformity with moderate osteoarthritis. Second hammertoe deformity   present. No radiopaque foreign body. Midfoot and forefoot neuropathic   degenerative changes with vascular calcifications.    < from: Xray Femur 2 Views, Left (07.14.18 @ 11:53) >    Findings/  impression: No acute fracture or dislocation. Greater trochanter   enthesopathy. Moderate left hip degenerative change and vascular   calcifications are noted.    MEDICATIONS  (STANDING):  atorvastatin 10 milliGRAM(s) Oral at bedtime  carvedilol 12.5 milliGRAM(s) Oral every 12 hours  enalapril 5 milliGRAM(s) Oral daily  enoxaparin Injectable 30 milliGRAM(s) SubCutaneous every 24 hours  letrozole 2.5 milliGRAM(s) Oral daily  NIFEdipine XL 30 milliGRAM(s) Oral daily  pantoprazole    Tablet 40 milliGRAM(s) Oral before breakfast  sodium chloride 0.9%. 1000 milliLiter(s) (75 mL/Hr) IV Continuous <Continuous>    MEDICATIONS  (PRN):  acetaminophen   Tablet. 325 milliGRAM(s) Oral every 4 hours PRN Moderate Pain (4 - 6)  ALPRAZolam 0.125 milliGRAM(s) Oral two times a day PRN anxiety  chlorhexidine 4% Liquid 1 Application(s) Topical <User Schedule> PRN bath

## 2018-07-18 NOTE — PROGRESS NOTE ADULT - ASSESSMENT
89 y/o female fell at home last night trying to clean the floor. She was found by neighbor this morning, pt was not able to get up. Pt lives alone and refused assisted living in the past, she was brought to ER and was not able to ambulate.    Pt was admitted after mechanical fall with traumatic muscle injury and rhabdomyolysis, she was able to ambulate with PT .  Yesterday pt was discharged to SNF but did not leave because BP was elevated. Today she feels good, denies any complaints, stable for discharge.

## 2018-07-24 DIAGNOSIS — Z85.3 PERSONAL HISTORY OF MALIGNANT NEOPLASM OF BREAST: ICD-10-CM
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
## 2018-07-24 DIAGNOSIS — E78.5 HYPERLIPIDEMIA, UNSPECIFIED: ICD-10-CM
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
## 2018-07-24 DIAGNOSIS — T79.6XXA TRAUMATIC ISCHEMIA OF MUSCLE, INITIAL ENCOUNTER: ICD-10-CM

## 2018-07-24 DIAGNOSIS — R26.0 ATAXIC GAIT: ICD-10-CM

## 2018-07-24 DIAGNOSIS — I10 ESSENTIAL (PRIMARY) HYPERTENSION: ICD-10-CM

## 2018-07-24 DIAGNOSIS — M79.662 PAIN IN LEFT LOWER LEG: ICD-10-CM

## 2018-12-10 NOTE — PATIENT PROFILE ADULT. - ACCEPTABLE
Hide Include Location In Plan Question?: No
Detail Level: Generalized
Include Location In Plan?: Yes
3

## 2020-08-31 NOTE — ED PROVIDER NOTE - ATTENDING CONTRIBUTION TO CARE
Previously Negative (within the last year)
I personally evaluated the patient. I reviewed the Resident’s or Physician Assistant’s note (as assigned above), and agree with the findings and plan except as documented in my note.  pt s/p fall, lives alone, PE as above, imaging unremarkable but unable to safely stand/walk, will admit for rehab/possible placement

## 2020-10-07 NOTE — PATIENT PROFILE ADULT. - EXTENSIONS OF SELF_ADULT
None Melolabial Transposition Flap Text: The defect edges were debeveled with a #15 scalpel blade.  Given the location of the defect and the proximity to free margins a melolabial flap was deemed most appropriate.  Using a sterile surgical marker, an appropriate melolabial transposition flap was drawn incorporating the defect.    The area thus outlined was incised deep to adipose tissue with a #15 scalpel blade.  The skin margins were undermined to an appropriate distance in all directions utilizing iris scissors.

## 2021-07-30 NOTE — ED PROVIDER NOTE - PROGRESS NOTE DETAILS
Cut his finger please.    Family called ED. Patient lives alone, states patient has been refusing assisted living. Family states she uses cane but does not ambulate well. Agrees with need for rehab and social service eval. Home not safe for patient Dr Werner accepts admission

## 2022-05-11 NOTE — DIETITIAN INITIAL EVALUATION ADULT. - PHYSICAL APPEARANCE
Price (Do Not Change): 0.00 Instructions: This plan will send the code FBSE to the PM system.  DO NOT or CHANGE the price. Detail Level: Simple BMI: 24.7. Alert & oriented. Abd soft, NT/ND. Last BM reportedly PTP (7/13). No chewing/swallowing difficulty reported. Skin: Stage II decubitus to sacrum.

## 2022-09-07 NOTE — ED ADULT TRIAGE NOTE - NS ED NOTE AC HIGH RISK COUNTRIES
9/7/2022                   Jacinda YEE Kaylin  [id number]  [address] [place] [address]      Dear Jacinda,    We are writing to inform you that you are due for your Annual Medicare Wellness Exam. DUE 01/01/2022.    There are no preventive care reminders to display for this patient.    This appointment will focus on preventative care including a Personalized Prevention Plan, a check of your weight, blood pressure and pulse, a brief check of your hearing and vision, any immunizations you may need, as well as referrals for any other screening services or test you may need.    Medicare will cover the cost of the preventative care services.  Any additional services including lab and some immunizations may not be a covered service.  If you have any other health concerns or questions to discuss with your provider, a medical visit may be added to your wellness visit and you may need to pay a deductible or co-pay depending on your Medicare plan.    Please call our office at [phone] to schedule your appointment with  Camilla Rice MD    We look forward to seeing you soon.      Camilla Rice MD  [id number] [facility] [place] [address]  [phone]  
No

## 2022-10-14 NOTE — PATIENT PROFILE ADULT. - SOCIAL CONCERNS
Requested medication(s) are due for refill today: Yes  Patient has already received a courtesy refill: No  Other reason request has been forwarded to provider: None